# Patient Record
Sex: FEMALE | Race: WHITE | NOT HISPANIC OR LATINO | URBAN - METROPOLITAN AREA
[De-identification: names, ages, dates, MRNs, and addresses within clinical notes are randomized per-mention and may not be internally consistent; named-entity substitution may affect disease eponyms.]

---

## 2024-10-03 ENCOUNTER — APPOINTMENT (RX ONLY)
Dept: URBAN - METROPOLITAN AREA CLINIC 329 | Facility: CLINIC | Age: 29
Setting detail: DERMATOLOGY
End: 2024-10-03

## 2024-10-03 DIAGNOSIS — L81.4 OTHER MELANIN HYPERPIGMENTATION: ICD-10-CM

## 2024-10-03 DIAGNOSIS — Z80.8 FAMILY HISTORY OF MALIGNANT NEOPLASM OF OTHER ORGANS OR SYSTEMS: ICD-10-CM

## 2024-10-03 DIAGNOSIS — D22 MELANOCYTIC NEVI: ICD-10-CM

## 2024-10-03 DIAGNOSIS — D485 NEOPLASM OF UNCERTAIN BEHAVIOR OF SKIN: ICD-10-CM

## 2024-10-03 PROBLEM — D22.72 MELANOCYTIC NEVI OF LEFT LOWER LIMB, INCLUDING HIP: Status: ACTIVE | Noted: 2024-10-03

## 2024-10-03 PROBLEM — D22.5 MELANOCYTIC NEVI OF TRUNK: Status: ACTIVE | Noted: 2024-10-03

## 2024-10-03 PROBLEM — D22.62 MELANOCYTIC NEVI OF LEFT UPPER LIMB, INCLUDING SHOULDER: Status: ACTIVE | Noted: 2024-10-03

## 2024-10-03 PROBLEM — D48.5 NEOPLASM OF UNCERTAIN BEHAVIOR OF SKIN: Status: ACTIVE | Noted: 2024-10-03

## 2024-10-03 PROCEDURE — 11102 TANGNTL BX SKIN SINGLE LES: CPT

## 2024-10-03 PROCEDURE — ? FULL BODY SKIN EXAM

## 2024-10-03 PROCEDURE — ? BIOPSY BY SHAVE METHOD

## 2024-10-03 PROCEDURE — ? COUNSELING

## 2024-10-03 PROCEDURE — 99203 OFFICE O/P NEW LOW 30 MIN: CPT | Mod: 25

## 2024-10-03 PROCEDURE — ? ADDITIONAL NOTES

## 2024-10-03 PROCEDURE — ? TREATMENT REGIMEN

## 2024-10-03 PROCEDURE — ? OBSERVATION

## 2024-10-03 ASSESSMENT — LOCATION DETAILED DESCRIPTION DERM
LOCATION DETAILED: RIGHT LATERAL PLANTAR MIDFOOT
LOCATION DETAILED: LEFT INFERIOR MEDIAL UPPER BACK
LOCATION DETAILED: RIGHT RIB CAGE
LOCATION DETAILED: LEFT LATERAL DISTAL UPPER ARM
LOCATION DETAILED: LEFT ANTERIOR DISTAL THIGH
LOCATION DETAILED: LEFT PROXIMAL DORSAL FOREARM
LOCATION DETAILED: LEFT POSTERIOR SHOULDER
LOCATION DETAILED: RIGHT FOREHEAD

## 2024-10-03 ASSESSMENT — LOCATION ZONE DERM
LOCATION ZONE: ARM
LOCATION ZONE: LEG
LOCATION ZONE: FEET
LOCATION ZONE: FACE
LOCATION ZONE: TRUNK

## 2024-10-03 ASSESSMENT — LOCATION SIMPLE DESCRIPTION DERM
LOCATION SIMPLE: ABDOMEN
LOCATION SIMPLE: LEFT UPPER ARM
LOCATION SIMPLE: LEFT UPPER BACK
LOCATION SIMPLE: RIGHT PLANTAR SURFACE
LOCATION SIMPLE: LEFT THIGH
LOCATION SIMPLE: RIGHT FOREHEAD
LOCATION SIMPLE: LEFT FOREARM
LOCATION SIMPLE: LEFT SHOULDER

## 2024-10-03 NOTE — HPI: EVALUATION OF SKIN LESION(S)
What Type Of Note Output Would You Prefer (Optional)?: Bullet Format
Hpi Title: Evaluation of Skin Lesions
How Severe Are Your Spot(S)?: mild
Family Member: Great grandfather
Additional History: Patient denies anything new or changing that she is aware of.

## 2024-10-03 NOTE — PROCEDURE: ADDITIONAL NOTES
Detail Level: Simple
Additional Notes: Patient reports lesion being sore to the touch for about four months. Lesion appears benign. A photo was taken and we will monitor.
Render Risk Assessment In Note?: no

## 2025-02-20 RX ORDER — LEVONORGESTREL AND ETHINYL ESTRADIOL 0.1-0.02MG
1 KIT ORAL DAILY
Qty: 84 TABLET | Refills: 3 | OUTPATIENT
Start: 2025-02-20

## 2025-03-24 ENCOUNTER — OFFICE VISIT (OUTPATIENT)
Dept: OBGYN CLINIC | Age: 30
End: 2025-03-24
Payer: COMMERCIAL

## 2025-03-24 VITALS
BODY MASS INDEX: 25.44 KG/M2 | WEIGHT: 149 LBS | HEIGHT: 64 IN | DIASTOLIC BLOOD PRESSURE: 72 MMHG | SYSTOLIC BLOOD PRESSURE: 116 MMHG

## 2025-03-24 DIAGNOSIS — Z01.419 WOMEN'S ANNUAL ROUTINE GYNECOLOGICAL EXAMINATION: Primary | ICD-10-CM

## 2025-03-24 PROCEDURE — 99459 PELVIC EXAMINATION: CPT | Performed by: NURSE PRACTITIONER

## 2025-03-24 PROCEDURE — 99395 PREV VISIT EST AGE 18-39: CPT | Performed by: NURSE PRACTITIONER

## 2025-03-24 NOTE — ASSESSMENT & PLAN NOTE
Pap up to date  Mammo na  Birth control - just started natural cycles, not TTC but ok if it occurs. Advised to take folic acid 400 mcg daily  Rto 1 year

## 2025-03-25 NOTE — PROGRESS NOTES
I have reviewed the patient's visit today including history, exam and assessment by SUE Sanchez.  I agree with treatment/plan as above.    Yang Grajeda MD  8:09 AM  03/25/25   
Patient here for AE.     Chaperone for Intimate Exam     Chaperone was offer accepted as part of the rooming process    Chaperone: Chantel Love       LAST PAP:  4/17/2024, neg.     LAST MAMMO:  never     LMP:  Patient's last menstrual period was 03/17/2025 (exact date).    BIRTH CONTROL:  none    TOBACCO USE:  No    FAMILY HISTORY OF:   Breast Cancer:  Yes   Ovarian Cancer:  No   Uterine Cancer:  No   Colon Cancer:  No    Vitals:    03/24/25 1521   BP: 116/72   BP Site: Right Upper Arm   Patient Position: Sitting   Weight: 67.6 kg (149 lb)   Height: 1.626 m (5' 4\")        CHANTEL LOVE RN  03/24/25  3:26 PM    
and awake. Oriented to person/place/time. Able to follow commands    Eyes: EOM nl, Sclera nl, Ocular Discharge not visualized    HENT: Normocephalic and atraumatic. Mouth/Throat: Mucous membranes are moist. External Ears: nl. No cervical code adneopathy    Neck: Normal range of motion. Neck supple. No thyromegaly present.     Cardiovascular: Normal rate and regular rhythm.      Pulmonary: Effort normal; No visualized signs of difficulty breathing or respiratory distress; breath sounds normal.    Breast: The breasts exhibit no masses, no skin changes and no tenderness. No axillary node adenopathy    Abdominal: Soft. There is no tenderness. There is no rebound.     Pelvic Exam:       External: normal female genitalia without lesions or masses       Vagina: normal without lesions or masses, scant clear physiologic discharge noted      Cervix: normal without lesions or masses       Adnexa: normal bimanual exam without masses or fullness       Uterus: uterus is normal size, shape, consistency and nontender    Musculoskeletal: Normal range of motion. Normal gait with no signs of ataxia     Neurological: No Facial Asymmetry (Cranial nerve 7 motor function); No gaze palsy; normal coordination, muscle strength and tone     Skin: Skin is warm and dry. No significant exanthematous lesions or discoloration noted on facial skin      Psych: Normal affect. No hallucinations            Assessment/Plan            Patient Active Problem List    Diagnosis Date Noted    Women's annual routine gynecological examination 03/24/2025     Assessment & Plan Note:     Pap up to date  Mammo na  Birth control - just started natural cycles, not TTC but ok if it occurs. Advised to take folic acid 400 mcg daily  Rto 1 year         Problem List Items Addressed This Visit          Other    Women's annual routine gynecological examination - Primary    Pap up to date  Mammo na  Birth control - just started natural cycles, not TTC but ok if it occurs.

## 2025-04-23 PROBLEM — Z01.419 WOMEN'S ANNUAL ROUTINE GYNECOLOGICAL EXAMINATION: Status: RESOLVED | Noted: 2025-03-24 | Resolved: 2025-04-23

## 2025-08-18 ENCOUNTER — PATIENT MESSAGE (OUTPATIENT)
Dept: FAMILY MEDICINE CLINIC | Facility: CLINIC | Age: 30
End: 2025-08-18

## 2025-08-18 DIAGNOSIS — R53.82 CHRONIC FATIGUE: ICD-10-CM

## 2025-08-18 DIAGNOSIS — M79.10 MYALGIA: ICD-10-CM

## 2025-08-18 DIAGNOSIS — Z00.00 ENCOUNTER FOR ROUTINE ADULT HEALTH EXAMINATION WITHOUT ABNORMAL FINDINGS: Primary | ICD-10-CM

## 2025-08-21 DIAGNOSIS — Z00.00 ENCOUNTER FOR ROUTINE ADULT HEALTH EXAMINATION WITHOUT ABNORMAL FINDINGS: ICD-10-CM

## 2025-08-21 DIAGNOSIS — R53.82 CHRONIC FATIGUE: ICD-10-CM

## 2025-08-21 DIAGNOSIS — M79.10 MYALGIA: ICD-10-CM

## 2025-08-21 LAB
25(OH)D3 SERPL-MCNC: 32.2 NG/ML (ref 30–100)
ALBUMIN SERPL-MCNC: 4.2 G/DL (ref 3.5–5)
ALBUMIN/GLOB SERPL: 1.5 (ref 1–1.9)
ALP SERPL-CCNC: 73 U/L (ref 35–104)
ALT SERPL-CCNC: 11 U/L (ref 8–45)
ANION GAP SERPL CALC-SCNC: 13 MMOL/L (ref 7–16)
AST SERPL-CCNC: 16 U/L (ref 15–37)
BASOPHILS # BLD: 0.04 K/UL (ref 0–0.2)
BASOPHILS NFR BLD: 0.6 % (ref 0–2)
BILIRUB SERPL-MCNC: 0.5 MG/DL (ref 0–1.2)
BUN SERPL-MCNC: 12 MG/DL (ref 6–23)
CALCIUM SERPL-MCNC: 9.7 MG/DL (ref 8.8–10.2)
CHLORIDE SERPL-SCNC: 104 MMOL/L (ref 98–107)
CHOLEST SERPL-MCNC: 192 MG/DL (ref 0–200)
CO2 SERPL-SCNC: 22 MMOL/L (ref 20–29)
CREAT SERPL-MCNC: 0.62 MG/DL (ref 0.6–1.1)
DIFFERENTIAL METHOD BLD: NORMAL
EOSINOPHIL # BLD: 0.19 K/UL (ref 0–0.8)
EOSINOPHIL NFR BLD: 2.6 % (ref 0.5–7.8)
ERYTHROCYTE [DISTWIDTH] IN BLOOD BY AUTOMATED COUNT: 12.9 % (ref 11.9–14.6)
FERRITIN SERPL-MCNC: 33 NG/ML (ref 8–388)
GLOBULIN SER CALC-MCNC: 2.8 G/DL (ref 2.3–3.5)
GLUCOSE SERPL-MCNC: 82 MG/DL (ref 70–99)
HCT VFR BLD AUTO: 40 % (ref 35.8–46.3)
HDLC SERPL-MCNC: 60 MG/DL (ref 40–60)
HDLC SERPL: 3.2 (ref 0–5)
HGB BLD-MCNC: 13 G/DL (ref 11.7–15.4)
IMM GRANULOCYTES # BLD AUTO: 0.02 K/UL (ref 0–0.5)
IMM GRANULOCYTES NFR BLD AUTO: 0.3 % (ref 0–5)
LDLC SERPL CALC-MCNC: 119 MG/DL (ref 0–100)
LYMPHOCYTES # BLD: 2.39 K/UL (ref 0.5–4.6)
LYMPHOCYTES NFR BLD: 33.3 % (ref 13–44)
MCH RBC QN AUTO: 26.7 PG (ref 26.1–32.9)
MCHC RBC AUTO-ENTMCNC: 32.5 G/DL (ref 31.4–35)
MCV RBC AUTO: 82.3 FL (ref 82–102)
MONOCYTES # BLD: 0.44 K/UL (ref 0.1–1.3)
MONOCYTES NFR BLD: 6.1 % (ref 4–12)
NEUTS SEG # BLD: 4.1 K/UL (ref 1.7–8.2)
NEUTS SEG NFR BLD: 57.1 % (ref 43–78)
NRBC # BLD: 0 K/UL (ref 0–0.2)
PLATELET # BLD AUTO: 282 K/UL (ref 150–450)
PMV BLD AUTO: 12.2 FL (ref 9.4–12.3)
POTASSIUM SERPL-SCNC: 4.4 MMOL/L (ref 3.5–5.1)
PROT SERPL-MCNC: 7 G/DL (ref 6.3–8.2)
RBC # BLD AUTO: 4.86 M/UL (ref 4.05–5.2)
SODIUM SERPL-SCNC: 138 MMOL/L (ref 136–145)
TRIGL SERPL-MCNC: 68 MG/DL (ref 0–150)
TSH W FREE THYROID IF ABNORMAL: 2.65 UIU/ML (ref 0.27–4.2)
VIT B12 SERPL-MCNC: 486 PG/ML (ref 193–986)
VLDLC SERPL CALC-MCNC: 14 MG/DL (ref 6–23)
WBC # BLD AUTO: 7.2 K/UL (ref 4.3–11.1)

## 2025-08-22 ENCOUNTER — RESULTS FOLLOW-UP (OUTPATIENT)
Dept: FAMILY MEDICINE CLINIC | Facility: CLINIC | Age: 30
End: 2025-08-22

## 2025-08-25 ENCOUNTER — OFFICE VISIT (OUTPATIENT)
Dept: FAMILY MEDICINE CLINIC | Facility: CLINIC | Age: 30
End: 2025-08-25
Payer: COMMERCIAL

## 2025-08-25 VITALS
DIASTOLIC BLOOD PRESSURE: 80 MMHG | BODY MASS INDEX: 25.47 KG/M2 | TEMPERATURE: 98.6 F | SYSTOLIC BLOOD PRESSURE: 130 MMHG | WEIGHT: 148.4 LBS | OXYGEN SATURATION: 99 % | HEART RATE: 62 BPM

## 2025-08-25 DIAGNOSIS — M79.10 MYALGIA: ICD-10-CM

## 2025-08-25 DIAGNOSIS — Z00.00 ENCOUNTER FOR ROUTINE ADULT HEALTH EXAMINATION WITHOUT ABNORMAL FINDINGS: Primary | ICD-10-CM

## 2025-08-25 PROCEDURE — 99395 PREV VISIT EST AGE 18-39: CPT | Performed by: FAMILY MEDICINE

## 2025-08-25 ASSESSMENT — ENCOUNTER SYMPTOMS
SHORTNESS OF BREATH: 0
DIARRHEA: 0
BACK PAIN: 1
WHEEZING: 0
CONSTIPATION: 0